# Patient Record
Sex: MALE | Race: WHITE
[De-identification: names, ages, dates, MRNs, and addresses within clinical notes are randomized per-mention and may not be internally consistent; named-entity substitution may affect disease eponyms.]

---

## 2019-05-14 LAB
CREAT SERPL-MCNC: 0.7 MG/DL (ref 0.5–1.3)
GFR AFRICAN AMERICAN: >60 ML/MIN/1.73M2
GFR NON-AFRICAN AMERICAN: >60 ML/MIN/1.73M2

## 2024-07-15 PROCEDURE — 93306 TTE W/DOPPLER COMPLETE: CPT | Performed by: INTERNAL MEDICINE

## 2024-09-27 PROCEDURE — 93018 CV STRESS TEST I&R ONLY: CPT | Performed by: INTERNAL MEDICINE

## 2024-09-27 PROCEDURE — 93016 CV STRESS TEST SUPVJ ONLY: CPT | Performed by: INTERNAL MEDICINE

## 2024-09-27 PROCEDURE — 78452 HT MUSCLE IMAGE SPECT MULT: CPT | Performed by: INTERNAL MEDICINE

## 2024-10-30 ENCOUNTER — HOSPITAL ENCOUNTER (EMERGENCY)
Age: 64
Discharge: HOME | End: 2024-10-30
Payer: MEDICARE

## 2024-10-30 VITALS — BODY MASS INDEX: 23.3 KG/M2

## 2024-10-30 VITALS
OXYGEN SATURATION: 99 % | TEMPERATURE: 97.88 F | SYSTOLIC BLOOD PRESSURE: 157 MMHG | HEART RATE: 82 BPM | DIASTOLIC BLOOD PRESSURE: 67 MMHG
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
## 2024-10-30 DIAGNOSIS — S13.9XXA: Primary | ICD-10-CM

## 2024-10-30 PROCEDURE — 99284 EMERGENCY DEPT VISIT MOD MDM: CPT

## 2024-10-30 PROCEDURE — 72125 CT NECK SPINE W/O DYE: CPT

## 2024-10-30 PROCEDURE — 70450 CT HEAD/BRAIN W/O DYE: CPT

## 2024-10-30 NOTE — CT_ITS
[address] [address] 
     [phone] 
  
  
Patient Name: 
STEPHEN H BOSWORTH 
  
MRN: [id number]    YOB: 1960    Sex: M 
Assigned Patient Location: ER 
Current Patient Location: .[facility] 
Accession/Order Number: [id number] 
Exam Date: 10/30/2024  12:04    Report Date: 10/30/2024  12:38 
  
At the request of: 
SANTI DEBBI CELINAMASON   
  
Procedure:  CT cervical spine wo con 
  
EXAMINATION: CT cervical spine wo con  
  
HISTORY: Fall 
  
COMPARISON: No relevant comparison available.  
  
TECHNIQUE: Axial, Coronal, and Sagittal images were created without IV  
contrast. Dose reduction techniques were achieved by using automated exposure  
control and/or adjustment of mA and/or kV according to patient size and/or use  
  
of iterative reconstruction technique. 
  
FINDINGS:  
VERTEBRAL BODIES: Reversal normal lordotic curvature with posterior apex at  
C4-5. Anterior listhesis of C4 on 5, 4 mm without disruption of the posterior  
elements. Mild anterior wedging of C5 and slight height reduction of C6. 
FACET JOINTS: Moderate-marked degenerative facet arthropathy C2-3 and C4-5. No  
  
disruption or abnormal widening. Osseous fusion of posterior elements of C3-4  
which is likely developmental. 
DISCS: Moderate-marked narrowing C3-4. Moderate narrowing C5 and C6, C6-7,  
C7-T1.  
CENTRAL CANAL: Moderate narrowing C4-5. Mild narrowing C5-6, C6-7. 
PARASPINAL AREA: No visible mass.  
  
ORDER #: [id number] CT/CT cervical spine wo con  
IMPRESSION:   
   
1. Moderate marked degenerative changes of cervical spine suspected to be   
chronic.  
2. No convincing acute abnormality. No prior studies for comparison.   
   
   
Electronically authenticated by: PARISA BUTTS   Date: 10/30/2024  12:38

## 2024-10-30 NOTE — ED.FALL1
HPI
HPI - Fall
General
Chief Complaint: Headache
Stated Complaint: HEAD AND NECK PAIN
Time Seen by Provider: 10/30/24 11:42
Source: patient
Mode of arrival: walk-in
Limitations: no limitations
History of Present Illness
HPI Narrative: 
This patient is here with her friend for evaluation of head and neck pain.  He says primarily his mid cervical spine hurts.  He is somewhat of a limited historian but says he did fall down a couple weeks ago.  He says it did not really bother him 
too much but because its persisted discomfort he came into the hospital.  He lives in [place], his friend was coming to [place] and decided to bring him to this hospital.  His friend says he seems to be just a little bit less steady on his feet 
than what he was previously but he has not been repeating himself, he has not been confused and has not been lethargic to his knowledge they live near each other in [place].  He is lab.  He does not have any vomiting or severe headache.  He has no 
loss of feeling in his extremities.  He is on a number of medications as done the record.  He has not had craniotomy before and has not had cervical spine surgery before.  He has not noticed any weakness of his upper limbs.
Related Data
Home Medications

?Medication ?Instructions ?Recorded ?Confirmed
aripiprazole 5 mg tablet 5 mg PO DAILY 10/30/24 10/30/24
atorvastatin 40 mg tablet 40 mg PO DAILY 10/30/24 10/30/24
benztropine 0.5 mg tablet 0.5 mg PO BID 10/30/24 10/30/24
buspirone 15 mg tablet mg 10/30/24 
buspirone 30 mg tablet 30 mg PO BID 10/30/24 10/30/24
cetirizine 10 mg tablet 10 mg PO DAILY 10/30/24 10/30/24
clopidogrel 75 mg tablet 75 mg PO DAILY 10/30/24 10/30/24
famotidine 40 mg tablet 40 mg PO DAILY 10/30/24 10/30/24
gabapentin 400 mg capsule 800 mg PO TID 10/30/24 10/30/24
hydroxyzine pamoate 25 mg capsule 25 mg PO Q8H PRN anxiety 10/30/24 10/30/24
lamotrigine 200 mg tablet 200 mg PO BID 10/30/24 10/30/24
losartan 25 mg tablet 25 mg PO DAILY 10/30/24 10/30/24
nitroglycerin 0.4 mg sublingual 0.4 mg sublingual Q5M PRN chest 10/30/24 10/30/24
tablet pain  
olanzapine 10 mg tablet mg 10/30/24 
olanzapine 20 mg tablet 20 mg PO DAILY 10/30/24 10/30/24
paliperidone palmitate 234 mg/1.5 234 mg IM Q30D 10/30/24 10/30/24
mL intramuscular syringe (Invega   
Sustenna)   
pantoprazole 40 mg tablet,delayed 40 mg PO DAILY 10/30/24 10/30/24
release   
ropinirole 1 mg tablet 1 mg PO DAILY 10/30/24 10/30/24
ubrogepant 100 mg tablet (Ubrelvy) 100 mg PO DAILY PRN headache 10/30/24 10/30/24


Allergies

Allergy/AdvReac Type Severity Reaction Status Date / Time
aspirin Allergy  Hives Verified 10/30/24 11:35
nalbuphine (From Nubain) Allergy  Seizure Verified 10/30/24 11:35
Penicillins Allergy  Hives Verified 10/30/24 11:35
shellfish derived Allergy  Anaphylaxis Verified 10/30/24 11:35



Opioid HPI
Opioid Management
Most Recent Pain and Opioid Data: 
      No Data to Display


PFS
PFS
Social History
Little interest or pleasure in doing things:  not at all 
Feeling down, depressed, or hopeless:  not at all 



Exam
Constitutional
Vital Signs, click to edit/add: 

Last Vital Signs

Temp  97.9 F   10/30/24 11:30
Pulse  82   10/30/24 11:30
Resp  18   10/30/24 11:30
BP  157/67 H  10/30/24 11:30
Pulse Ox  99   10/30/24 11:30
O2 Del Method  Room Air  10/30/24 11:30




Course
Vital Signs
Vital signs: 

Vital Signs

Temperature  97.9 F   10/30/24 11:30
Pulse Rate  82   10/30/24 11:30
Respiratory Rate  18   10/30/24 11:30
Blood Pressure  157/67 H  10/30/24 11:30
Pulse Oximetry  99   10/30/24 11:30
Oxygen Delivery Method  Room Air  10/30/24 11:30



Temperature  97.9 F   10/30/24 11:30
Pulse Rate  82   10/30/24 11:30
Respiratory Rate  18   10/30/24 11:30
Blood Pressure  157/67 H  10/30/24 11:30
Pulse Oximetry  99   10/30/24 11:30
Oxygen Delivery Method  Room Air  10/30/24 11:30




Discharge Plan
Discharge
Chief Complaint: Headache

Prescriptions / Home Meds:
No Action
  aripiprazole 5 mg tablet 
   5 mg PO DAILY 
  atorvastatin 40 mg tablet 
   40 mg PO DAILY 
  benztropine 0.5 mg tablet 
   0.5 mg PO BID 
  buspirone 30 mg tablet 
   30 mg PO BID 
  buspirone 15 mg tablet 
       
  cetirizine 10 mg tablet 
   10 mg PO DAILY 
  clopidogrel 75 mg tablet 
   75 mg PO DAILY 
  famotidine 40 mg tablet 
   40 mg PO DAILY 
  gabapentin 400 mg capsule 
   800 mg PO TID 
  hydroxyzine pamoate 25 mg capsule 
   25 mg PO Q8H PRN (Reason: anxiety) 
  lamotrigine 200 mg tablet 
   200 mg PO BID 
  losartan 25 mg tablet 
   25 mg PO DAILY 
  nitroglycerin 0.4 mg tablet, sublingual 
   0.4 mg sublingual Q5M PRN (Reason: chest pain) 
  olanzapine 10 mg tablet 
       
  olanzapine 20 mg tablet 
   20 mg PO DAILY 
   Rx Instructions:
   at bedtime
  Invega Sustenna 234 mg/1.5 mL syringe 
   234 mg IM Q30D 
  pantoprazole 40 mg tablet,delayed release (DR/EC) 
   40 mg PO DAILY 
  ropinirole 1 mg tablet 
   1 mg PO DAILY 
  Ubrelvy 100 mg tablet 
   100 mg PO DAILY PRN (Reason: headache) 

Print Language: English

## 2024-10-30 NOTE — ED.GENADUL1
HPI
HPI - General Adult
General
Chief complaint: Headache
Stated complaint: HEAD AND NECK PAIN
Time Seen by Provider: 10/30/24 11:42
Source: patient
Mode of arrival: walk-in
Limitations: no limitations
History of Present Illness
HPI narrative: 
First station on this patient was lost in the system.  This is a second dictation done on the same day of the encounter.

This patient is here complaining of head and neck pain.  He fell between 1 and 2 weeks ago off of a chair.  His friend drove him to the hospital because he thinks he has been less stable on his feet.  He has not been vomiting.  He has not been 
confused or repeating himself he is not hallucinating and there is no other change in his cognition or mental status.  Apparently he is on blood thinners.  He does not have any paresis paresthesias tingling numbness or loss of coordination or 
strength in his upper or lower limbs according to the patient himself.  There is no other complaints today.
Related Data
Home Medications

?Medication ?Instructions ?Recorded ?Confirmed
aripiprazole 5 mg tablet 5 mg PO DAILY 10/30/24 10/30/24
atorvastatin 40 mg tablet 40 mg PO DAILY 10/30/24 10/30/24
benztropine 0.5 mg tablet 0.5 mg PO BID 10/30/24 10/30/24
buspirone 15 mg tablet mg 10/30/24 
buspirone 30 mg tablet 30 mg PO BID 10/30/24 10/30/24
cetirizine 10 mg tablet 10 mg PO DAILY 10/30/24 10/30/24
clopidogrel 75 mg tablet 75 mg PO DAILY 10/30/24 10/30/24
famotidine 40 mg tablet 40 mg PO DAILY 10/30/24 10/30/24
gabapentin 400 mg capsule 800 mg PO TID 10/30/24 10/30/24
hydroxyzine pamoate 25 mg capsule 25 mg PO Q8H PRN anxiety 10/30/24 10/30/24
lamotrigine 200 mg tablet 200 mg PO BID 10/30/24 10/30/24
losartan 25 mg tablet 25 mg PO DAILY 10/30/24 10/30/24
nitroglycerin 0.4 mg sublingual 0.4 mg sublingual Q5M PRN chest 10/30/24 10/30/24
tablet pain  
olanzapine 10 mg tablet mg 10/30/24 
olanzapine 20 mg tablet 20 mg PO DAILY 10/30/24 10/30/24
paliperidone palmitate 234 mg/1.5 234 mg IM Q30D 10/30/24 10/30/24
mL intramuscular syringe (Invega   
Sustenna)   
pantoprazole 40 mg tablet,delayed 40 mg PO DAILY 10/30/24 10/30/24
release   
ropinirole 1 mg tablet 1 mg PO DAILY 10/30/24 10/30/24
ubrogepant 100 mg tablet (Ubrelvy) 100 mg PO DAILY PRN headache 10/30/24 10/30/24


Allergies

Allergy/AdvReac Type Severity Reaction Status Date / Time
aspirin Allergy  Hives Verified 10/30/24 11:35
nalbuphine (From Nubain) Allergy  Seizure Verified 10/30/24 11:35
Penicillins Allergy  Hives Verified 10/30/24 11:35
shellfish derived Allergy  Anaphylaxis Verified 10/30/24 11:35



Opioid HPI
Opioid Management
Most Recent Opioid Data: 
      No Data to Display


PFSH
PFSH
Social History
Little interest or pleasure in doing things:  not at all 
Feeling down, depressed, or hopeless:  not at all 



Exam
Narrative
Exam Narrative: 
Patient is awake alert oriented x 3 no confusion or evidence of amnesia.  His ambulation and gait are normal he is not off balance.

Neurological examination shows GCS to be 15.  He has mild restriction of motion in his neck but it is nontender.  He has outstanding strength in his upper and lower extremities.  There is no CSF otorrhea or rhinorrhea.  There is no ecchymosis in the 
calvarium or the back area.

His chest abdomen and extremities are asymptomatic and atraumatic.
Constitutional
Vital Signs, click to edit/add: 

Last Vital Signs

Temp  97.9 F   10/30/24 11:30
Pulse  82   10/30/24 11:30
Resp  18   10/30/24 11:30
BP  157/67 H  10/30/24 11:30
Pulse Ox  99   10/30/24 11:30
O2 Del Method  Room Air  10/30/24 11:30




Course
Vital Signs
Vital signs: 

Vital Signs

Temperature  97.9 F   10/30/24 11:30
Pulse Rate  82   10/30/24 11:30
Respiratory Rate  18   10/30/24 11:30
Blood Pressure  157/67 H  10/30/24 11:30
Pulse Oximetry  99   10/30/24 11:30
Oxygen Delivery Method  Room Air  10/30/24 11:30



Temperature  97.9 F   10/30/24 11:30
Pulse Rate  82   10/30/24 11:30
Respiratory Rate  18   10/30/24 11:30
Blood Pressure  157/67 H  10/30/24 11:30
Pulse Oximetry  99   10/30/24 11:30
Oxygen Delivery Method  Room Air  10/30/24 11:30




Medical Decision Making
MDM Narrative
Medical decision making narrative: 
CT scan without contrast of the brain shows what appears to be fluid from a chronic subarachnoid cyst but no acute findings fractures or hematomas are noted.  His cervical spine CT shows degenerative changes but no acute findings.  In the absence of 
any neurological findings I do not believe he needs any further diagnostic workup we will place him on short-term course of Toradol.  He is to follow-up with his primary care doctor in the [facility]

Discharge Plan
Discharge
Chief Complaint: Headache

Clinical Impression:
 Cervical sprain


Patient Disposition: Home, Self-Care

Time of Disposition Decision: 12:46

Prescriptions / Home Meds:
No Action
  aripiprazole 5 mg tablet 
   5 mg PO DAILY 
  atorvastatin 40 mg tablet 
   40 mg PO DAILY 
  benztropine 0.5 mg tablet 
   0.5 mg PO BID 
  buspirone 30 mg tablet 
   30 mg PO BID 
  buspirone 15 mg tablet 
       
  cetirizine 10 mg tablet 
   10 mg PO DAILY 
  clopidogrel 75 mg tablet 
   75 mg PO DAILY 
  famotidine 40 mg tablet 
   40 mg PO DAILY 
  gabapentin 400 mg capsule 
   800 mg PO TID 
  hydroxyzine pamoate 25 mg capsule 
   25 mg PO Q8H PRN (Reason: anxiety) 
  lamotrigine 200 mg tablet 
   200 mg PO BID 
  losartan 25 mg tablet 
   25 mg PO DAILY 
  nitroglycerin 0.4 mg tablet, sublingual 
   0.4 mg sublingual Q5M PRN (Reason: chest pain) 
  olanzapine 10 mg tablet 
       
  olanzapine 20 mg tablet 
   20 mg PO DAILY 
   Rx Instructions:
   at bedtime
  Invega Sustenna 234 mg/1.5 mL syringe 
   234 mg IM Q30D 
  pantoprazole 40 mg tablet,delayed release (DR/EC) 
   40 mg PO DAILY 
  ropinirole 1 mg tablet 
   1 mg PO DAILY 
  Ubrelvy 100 mg tablet 
   100 mg PO DAILY PRN (Reason: headache) 

Print Language: English

Additional Instructions:
Toradol/follow-up your primary care doctor to consider physical therapy if you do not improve

Referrals:
Alejandra Calixto, NP [Primary Care Provider] - 1 week

## 2024-10-30 NOTE — ED_ITS
HPI    
HPI - General Adult    
General    
Chief complaint: Headache    
Stated complaint: HEAD AND NECK PAIN    
Time Seen by Provider: 10/30/24 11:42    
Source: patient    
Mode of arrival: walk-in    
Limitations: no limitations    
History of Present Illness    
HPI narrative:     
First station on this patient was lost in the system.  This is a second   
dictation done on the same day of the encounter.    
    
This patient is here complaining of head and neck pain.  He fell between 1 and 2  
weeks ago off of a chair.  His friend drove him to the hospital because he   
thinks he has been less stable on his feet.  He has not been vomiting.  He has   
not been confused or repeating himself he is not hallucinating and there is no   
other change in his cognition or mental status.  Apparently he is on blood   
thinners.  He does not have any paresis paresthesias tingling numbness or loss   
of coordination or strength in his upper or lower limbs according to the patient  
himself.  There is no other complaints today.    
Related Data    
                                Home Medications    
    
    
    
?Medication ?Instructions ?Recorded ?Confirmed    
     
aripiprazole 5 mg tablet 5 mg PO DAILY 10/30/24 10/30/24    
     
atorvastatin 40 mg tablet 40 mg PO DAILY 10/30/24 10/30/24    
     
benztropine 0.5 mg tablet 0.5 mg PO BID 10/30/24 10/30/24    
     
buspirone 15 mg tablet mg 10/30/24     
     
buspirone 30 mg tablet 30 mg PO BID 10/30/24 10/30/24    
     
cetirizine 10 mg tablet 10 mg PO DAILY 10/30/24 10/30/24    
     
clopidogrel 75 mg tablet 75 mg PO DAILY 10/30/24 10/30/24    
     
famotidine 40 mg tablet 40 mg PO DAILY 10/30/24 10/30/24    
     
gabapentin 400 mg capsule 800 mg PO TID 10/30/24 10/30/24    
     
hydroxyzine pamoate 25 mg capsule 25 mg PO Q8H PRN anxiety 10/30/24 10/30/24    
     
lamotrigine 200 mg tablet 200 mg PO BID 10/30/24 10/30/24    
     
losartan 25 mg tablet 25 mg PO DAILY 10/30/24 10/30/24    
     
nitroglycerin 0.4 mg sublingual 0.4 mg sublingual Q5M PRN chest 10/30/24   
10/30/24    
    
tablet pain      
     
olanzapine 10 mg tablet mg 10/30/24     
     
olanzapine 20 mg tablet 20 mg PO DAILY 10/30/24 10/30/24    
     
paliperidone palmitate 234 mg/1.5 234 mg IM Q30D 10/30/24 10/30/24    
    
mL intramuscular syringe (Invega       
    
Sustenna)       
     
pantoprazole 40 mg tablet,delayed 40 mg PO DAILY 10/30/24 10/30/24    
    
release       
     
ropinirole 1 mg tablet 1 mg PO DAILY 10/30/24 10/30/24    
     
ubrogepant 100 mg tablet (Ubrelvy) 100 mg PO DAILY PRN headache 10/30/24   
10/30/24    
    
    
    
                                    Allergies    
    
    
    
Allergy/AdvReac Type Severity Reaction Status Date / Time    
     
aspirin Allergy  Hives Verified 10/30/24 11:35    
     
nalbuphine (From Nubain) Allergy  Seizure Verified 10/30/24 11:35    
     
Penicillins Allergy  Hives Verified 10/30/24 11:35    
     
shellfish derived Allergy  Anaphylaxis Verified 10/30/24 11:35    
    
    
    
    
Opioid HPI    
Opioid Management    
Most Recent Opioid Data:     
    
    
                                        No Data to Display    
    
    
    
PFSH    
PFSH    
Social History    
Little interest or pleasure in doing things:  not at all     
Feeling down, depressed, or hopeless:  not at all     
    
    
    
Exam    
Narrative    
Exam Narrative:     
Patient is awake alert oriented x 3 no confusion or evidence of amnesia.  His   
ambulation and gait are normal he is not off balance.    
    
Neurological examination shows GCS to be 15.  He has mild restriction of motion   
in his neck but it is nontender.  He has outstanding strength in his upper and   
lower extremities.  There is no CSF otorrhea or rhinorrhea.  There is no   
ecchymosis in the calvarium or the back area.    
    
His chest abdomen and extremities are asymptomatic and atraumatic.    
Constitutional    
Vital Signs, click to edit/add:     
    
                                Last Vital Signs    
    
    
    
Temp  97.9 F   10/30/24 11:30    
     
Pulse  82   10/30/24 11:30    
     
Resp  18   10/30/24 11:30    
     
BP  157/67 H  10/30/24 11:30    
     
Pulse Ox  99   10/30/24 11:30    
     
O2 Del Method  Room Air  10/30/24 11:30    
    
    
    
    
    
Course    
Vital Signs    
Vital signs:     
    
                                   Vital Signs    
    
    
    
Temperature  97.9 F   10/30/24 11:30    
     
Pulse Rate  82   10/30/24 11:30    
     
Respiratory Rate  18   10/30/24 11:30    
     
Blood Pressure  157/67 H  10/30/24 11:30    
     
Pulse Oximetry  99   10/30/24 11:30    
     
Oxygen Delivery Method  Room Air  10/30/24 11:30    
    
    
                                            
    
    
    
Temperature  97.9 F   10/30/24 11:30    
     
Pulse Rate  82   10/30/24 11:30    
     
Respiratory Rate  18   10/30/24 11:30    
     
Blood Pressure  157/67 H  10/30/24 11:30    
     
Pulse Oximetry  99   10/30/24 11:30    
     
Oxygen Delivery Method  Room Air  10/30/24 11:30    
    
    
    
    
    
Medical Decision Making    
MDM Narrative    
Medical decision making narrative:     
CT scan without contrast of the brain shows what appears to be fluid from a   
chronic subarachnoid cyst but no acute findings fractures or hematomas are   
noted.  His cervical spine CT shows degenerative changes but no acute findings.   
In the absence of any neurological findings I do not believe he needs any   
further diagnostic workup we will place him on short-term course of Toradol.  He  
is to follow-up with his primary care doctor in the [facility]    
    
Discharge Plan    
Discharge    
Chief Complaint: Headache    
    
Clinical Impression:    
 Cervical sprain    
    
    
Patient Disposition: Home, Self-Care    
    
Time of Disposition Decision: 12:46    
    
Prescriptions / Home Meds:    
No Action    
  aripiprazole 5 mg tablet     
   5 mg PO DAILY     
  atorvastatin 40 mg tablet     
   40 mg PO DAILY     
  benztropine 0.5 mg tablet     
   0.5 mg PO BID     
  buspirone 30 mg tablet     
   30 mg PO BID     
  buspirone 15 mg tablet     
           
  cetirizine 10 mg tablet     
   10 mg PO DAILY     
  clopidogrel 75 mg tablet     
   75 mg PO DAILY     
  famotidine 40 mg tablet     
   40 mg PO DAILY     
  gabapentin 400 mg capsule     
   800 mg PO TID     
  hydroxyzine pamoate 25 mg capsule     
   25 mg PO Q8H PRN (Reason: anxiety)     
  lamotrigine 200 mg tablet     
   200 mg PO BID     
  losartan 25 mg tablet     
   25 mg PO DAILY     
  nitroglycerin 0.4 mg tablet, sublingual     
   0.4 mg sublingual Q5M PRN (Reason: chest pain)     
  olanzapine 10 mg tablet     
           
  olanzapine 20 mg tablet     
   20 mg PO DAILY     
   Rx Instructions:    
   at bedtime    
  Invega Sustenna 234 mg/1.5 mL syringe     
   234 mg IM Q30D     
  pantoprazole 40 mg tablet,delayed release (DR/EC)     
   40 mg PO DAILY     
  ropinirole 1 mg tablet     
   1 mg PO DAILY     
  Ubrelvy 100 mg tablet     
   100 mg PO DAILY PRN (Reason: headache)     
    
Print Language: English    
    
Additional Instructions:    
Toradol/follow-up your primary care doctor to consider physical therapy if you   
do not improve    
    
Referrals:    
Alejandra Calixto, NP [Primary Care Provider] - 1 week

## 2024-10-30 NOTE — CT_ITS
The [address] [address] 
     [phone] 
  
  
Patient Name: 
STEPHEN H BOSWORTH 
  
MRN: [id number]    YOB: 1960    Sex: M 
Assigned Patient Location: ER 
Current Patient Location: ED.[facility] 
Accession/Order Number: [id number] 
Exam Date: 10/30/2024  12:04    Report Date: 10/30/2024  12:25 
  
At the request of: 
SANTI DEBBI DEANGELO   
  
Procedure:  CT head/brain wo con 
  
EXAM: CT head/brain wo con  
  
HISTORY: Fall 
  
COMPARISON: None.  
  
TECHNIQUE: Multiple thin computed tomograms of the head were obtained, with  
sagittal and coronal reconstructions. Radiation reduction technique and  
algorithms were utilized during the study. 
  
FINDINGS: The ventricles are not enlarged, the lateral ventricles are  
symmetric  
and the third ventricles in the midline. The sylvian fissures and cortical  
sulci are unremarkable. There is no evidence of an intracranial hemorrhage,  
mass lesion or apparent acute infarct. No abnormality is seen in the deep  
white  
matter. 
  
Fluid collections are seen in the posterior aspect of the posterior fossa,  
most  
likely due to one or more arachnoid cyst. The cerebellum and visualized  
brainstem are otherwise intact. The paranasal sinuses are clear as visualized.  
  
The middle ears are aerated. The mastoid sinuses are clear. There is no  
apparent acute skull fracture. 
  
ORDER #: [id number] CT/CT head/brain wo con  
IMPRESSION:   
   
There is no evidence of an intracranial hemorrhage, mass lesion or apparent   
acute infarct. Fluid collection in the posterior fossa posteriorly in the   
midline are probably one or more arachnoid cyst. The paranasal sinuses are   
clear. There is no apparent acute skull fracture. Comparison with a previous   
study would be helpful in confirming the chronicity of these findings.  
   
   
Electronically authenticated by: TAWANNA ROMERO   Date: 10/30/2024  12:25

## 2024-10-30 NOTE — ED_ITS
HPI    
HPI - Fall    
General    
Chief Complaint: Headache    
Stated Complaint: HEAD AND NECK PAIN    
Time Seen by Provider: 10/30/24 11:42    
Source: patient    
Mode of arrival: walk-in    
Limitations: no limitations    
History of Present Illness    
HPI Narrative:     
This patient is here with her friend for evaluation of head and neck pain.  He   
says primarily his mid cervical spine hurts.  He is somewhat of a limited his  
emilio but says he did fall down a couple weeks ago.  He says it did not really   
bother him too much but because its persisted discomfort he came into the   
hospital.  He lives in [place], his friend was coming to [place] and decided   
to bring him to this hospital.  His friend says he seems to be just a little bit  
less steady on his feet than what he was previously but he has not been   
repeating himself, he has not been confused and has not been lethargic to his   
knowledge they live near each other in [place].  He is lab.  He does not have   
any vomiting or severe headache.  He has no loss of feeling in his extremities.   
He is on a number of medications as done the record.  He has not had craniotomy   
before and has not had cervical spine surgery before.  He has not noticed any   
weakness of his upper limbs.    
Related Data    
                                Home Medications    
    
    
    
?Medication ?Instructions ?Recorded ?Confirmed    
     
aripiprazole 5 mg tablet 5 mg PO DAILY 10/30/24 10/30/24    
     
atorvastatin 40 mg tablet 40 mg PO DAILY 10/30/24 10/30/24    
     
benztropine 0.5 mg tablet 0.5 mg PO BID 10/30/24 10/30/24    
     
buspirone 15 mg tablet mg 10/30/24     
     
buspirone 30 mg tablet 30 mg PO BID 10/30/24 10/30/24    
     
cetirizine 10 mg tablet 10 mg PO DAILY 10/30/24 10/30/24    
     
clopidogrel 75 mg tablet 75 mg PO DAILY 10/30/24 10/30/24    
     
famotidine 40 mg tablet 40 mg PO DAILY 10/30/24 10/30/24    
     
gabapentin 400 mg capsule 800 mg PO TID 10/30/24 10/30/24    
     
hydroxyzine pamoate 25 mg capsule 25 mg PO Q8H PRN anxiety 10/30/24 10/30/24    
     
lamotrigine 200 mg tablet 200 mg PO BID 10/30/24 10/30/24    
     
losartan 25 mg tablet 25 mg PO DAILY 10/30/24 10/30/24    
     
nitroglycerin 0.4 mg sublingual 0.4 mg sublingual Q5M PRN chest 10/30/24   
10/30/24    
    
tablet pain      
     
olanzapine 10 mg tablet mg 10/30/24     
     
olanzapine 20 mg tablet 20 mg PO DAILY 10/30/24 10/30/24    
     
paliperidone palmitate 234 mg/1.5 234 mg IM Q30D 10/30/24 10/30/24    
    
mL intramuscular syringe (Invega       
    
Sustenna)       
     
pantoprazole 40 mg tablet,delayed 40 mg PO DAILY 10/30/24 10/30/24    
    
release       
     
ropinirole 1 mg tablet 1 mg PO DAILY 10/30/24 10/30/24    
     
ubrogepant 100 mg tablet (Ubrelvy) 100 mg PO DAILY PRN headache 10/30/24   
10/30/24    
    
    
    
                                    Allergies    
    
    
    
Allergy/AdvReac Type Severity Reaction Status Date / Time    
     
aspirin Allergy  Hives Verified 10/30/24 11:35    
     
nalbuphine (From Nubain) Allergy  Seizure Verified 10/30/24 11:35    
     
Penicillins Allergy  Hives Verified 10/30/24 11:35    
     
shellfish derived Allergy  Anaphylaxis Verified 10/30/24 11:35    
    
    
    
    
Opioid HPI    
Opioid Management    
Most Recent Pain and Opioid Data:     
    
    
                                        No Data to Display    
    
    
    
[facility]    
PFS    
Social History    
Little interest or pleasure in doing things:  not at all     
Feeling down, depressed, or hopeless:  not at all     
    
    
    
Exam    
Constitutional    
Vital Signs, click to edit/add:     
    
                                Last Vital Signs    
    
    
    
Temp  97.9 F   10/30/24 11:30    
     
Pulse  82   10/30/24 11:30    
     
Resp  18   10/30/24 11:30    
     
BP  157/67 H  10/30/24 11:30    
     
Pulse Ox  99   10/30/24 11:30    
     
O2 Del Method  Room Air  10/30/24 11:30    
    
    
    
    
    
Course    
Vital Signs    
Vital signs:     
    
                                   Vital Signs    
    
    
    
Temperature  97.9 F   10/30/24 11:30    
     
Pulse Rate  82   10/30/24 11:30    
     
Respiratory Rate  18   10/30/24 11:30    
     
Blood Pressure  157/67 H  10/30/24 11:30    
     
Pulse Oximetry  99   10/30/24 11:30    
     
Oxygen Delivery Method  Room Air  10/30/24 11:30    
    
    
                                            
    
    
    
Temperature  97.9 F   10/30/24 11:30    
     
Pulse Rate  82   10/30/24 11:30    
     
Respiratory Rate  18   10/30/24 11:30    
     
Blood Pressure  157/67 H  10/30/24 11:30    
     
Pulse Oximetry  99   10/30/24 11:30    
     
Oxygen Delivery Method  Room Air  10/30/24 11:30    
    
    
    
    
    
Discharge Plan    
Discharge    
Chief Complaint: Headache    
    
Prescriptions / Home Meds:    
No Action    
  aripiprazole 5 mg tablet     
   5 mg PO DAILY     
  atorvastatin 40 mg tablet     
   40 mg PO DAILY     
  benztropine 0.5 mg tablet     
   0.5 mg PO BID     
  buspirone 30 mg tablet     
   30 mg PO BID     
  buspirone 15 mg tablet     
           
  cetirizine 10 mg tablet     
   10 mg PO DAILY     
  clopidogrel 75 mg tablet     
   75 mg PO DAILY     
  famotidine 40 mg tablet     
   40 mg PO DAILY     
  gabapentin 400 mg capsule     
   800 mg PO TID     
  hydroxyzine pamoate 25 mg capsule     
   25 mg PO Q8H PRN (Reason: anxiety)     
  lamotrigine 200 mg tablet     
   200 mg PO BID     
  losartan 25 mg tablet     
   25 mg PO DAILY     
  nitroglycerin 0.4 mg tablet, sublingual     
   0.4 mg sublingual Q5M PRN (Reason: chest pain)     
  olanzapine 10 mg tablet     
           
  olanzapine 20 mg tablet     
   20 mg PO DAILY     
   Rx Instructions:    
   at bedtime    
  Invega Sustenna 234 mg/1.5 mL syringe     
   234 mg IM Q30D     
  pantoprazole 40 mg tablet,delayed release (DR/EC)     
   40 mg PO DAILY     
  ropinirole 1 mg tablet     
   1 mg PO DAILY     
  Ubrelvy 100 mg tablet     
   100 mg PO DAILY PRN (Reason: headache)     
    
Print Language: English